# Patient Record
Sex: MALE | Race: WHITE | NOT HISPANIC OR LATINO | Employment: OTHER
[De-identification: names, ages, dates, MRNs, and addresses within clinical notes are randomized per-mention and may not be internally consistent; named-entity substitution may affect disease eponyms.]

---

## 2022-01-12 ENCOUNTER — NEW PATIENT (OUTPATIENT)
Age: 82
End: 2022-01-12

## 2022-01-12 DIAGNOSIS — Z96.1: ICD-10-CM

## 2022-01-12 DIAGNOSIS — H35.371: ICD-10-CM

## 2022-01-12 PROCEDURE — 92015 DETERMINE REFRACTIVE STATE: CPT

## 2022-01-12 PROCEDURE — 92134 CPTRZ OPH DX IMG PST SGM RTA: CPT

## 2022-01-12 PROCEDURE — 92004 COMPRE OPH EXAM NEW PT 1/>: CPT

## 2022-01-12 ASSESSMENT — VISUAL ACUITY
OS_CC: 20/25
OD_CC: 20/30
OD_SC: 20/30
OS_SC: 20/40

## 2022-01-12 ASSESSMENT — TONOMETRY
OS_IOP_MMHG: 18
OD_IOP_MMHG: 18

## 2022-01-12 NOTE — PATIENT DISCUSSION
Patient understands condition, prognosis and need for follow up care.   [facility], seeing retina specialist 2 times per Year.  Continue with specialists.

## 2022-01-12 NOTE — PATIENT DISCUSSION
Monitor. Detailed refraction found prescriptions change mostly OD.  Trial framed.  Patient saw improved vision.  Explained limitations of changing rx.